# Patient Record
Sex: MALE | Race: WHITE | Employment: FULL TIME | URBAN - METROPOLITAN AREA
[De-identification: names, ages, dates, MRNs, and addresses within clinical notes are randomized per-mention and may not be internally consistent; named-entity substitution may affect disease eponyms.]

---

## 2023-01-11 ENCOUNTER — APPOINTMENT (OUTPATIENT)
Dept: CT IMAGING | Age: 63
End: 2023-01-11
Attending: EMERGENCY MEDICINE

## 2023-01-11 ENCOUNTER — APPOINTMENT (OUTPATIENT)
Dept: GENERAL RADIOLOGY | Age: 63
End: 2023-01-11
Attending: EMERGENCY MEDICINE

## 2023-01-11 ENCOUNTER — HOSPITAL ENCOUNTER (EMERGENCY)
Age: 63
Discharge: HOME | End: 2023-01-11
Attending: EMERGENCY MEDICINE

## 2023-01-11 VITALS
TEMPERATURE: 97.9 F | RESPIRATION RATE: 14 BRPM | SYSTOLIC BLOOD PRESSURE: 134 MMHG | HEART RATE: 67 BPM | WEIGHT: 170 LBS | DIASTOLIC BLOOD PRESSURE: 80 MMHG | OXYGEN SATURATION: 98 %

## 2023-01-11 DIAGNOSIS — S42.109A CLOSED FRACTURE OF SCAPULA, UNSPECIFIED LATERALITY, UNSPECIFIED PART OF SCAPULA, INITIAL ENCOUNTER: Primary | ICD-10-CM

## 2023-01-11 LAB
ALBUMIN SERPL-MCNC: 3.7 G/DL (ref 3.4–5)
ALBUMIN/GLOB SERPL: 0.9 (ref 0.8–1.7)
ALP SERPL-CCNC: 71 U/L (ref 45–117)
ALT SERPL-CCNC: 24 U/L (ref 16–61)
ANION GAP SERPL CALC-SCNC: 6 MMOL/L (ref 3–18)
AST SERPL-CCNC: 33 U/L (ref 10–38)
ATRIAL RATE: 56 BPM
BASOPHILS # BLD: 0.1 K/UL (ref 0–0.1)
BASOPHILS NFR BLD: 1 % (ref 0–2)
BILIRUB SERPL-MCNC: 0.4 MG/DL (ref 0.2–1)
BUN SERPL-MCNC: 21 MG/DL (ref 7–18)
BUN/CREAT SERPL: 18 (ref 12–20)
CALCIUM SERPL-MCNC: 9.1 MG/DL (ref 8.5–10.1)
CALCULATED R AXIS, ECG10: -12 DEGREES
CALCULATED T AXIS, ECG11: 57 DEGREES
CHLORIDE SERPL-SCNC: 105 MMOL/L (ref 100–111)
CO2 SERPL-SCNC: 26 MMOL/L (ref 21–32)
CREAT SERPL-MCNC: 1.14 MG/DL (ref 0.6–1.3)
DIAGNOSIS, 93000: NORMAL
DIFFERENTIAL METHOD BLD: ABNORMAL
EOSINOPHIL # BLD: 0.1 K/UL (ref 0–0.4)
EOSINOPHIL NFR BLD: 0 % (ref 0–5)
ERYTHROCYTE [DISTWIDTH] IN BLOOD BY AUTOMATED COUNT: 13.8 % (ref 11.6–14.5)
GLOBULIN SER CALC-MCNC: 4 G/DL (ref 2–4)
GLUCOSE SERPL-MCNC: 106 MG/DL (ref 74–99)
HCT VFR BLD AUTO: 40.7 % (ref 36–48)
HGB BLD-MCNC: 12.8 G/DL (ref 13–16)
IMM GRANULOCYTES # BLD AUTO: 0.1 K/UL (ref 0–0.04)
IMM GRANULOCYTES NFR BLD AUTO: 0 % (ref 0–0.5)
LYMPHOCYTES # BLD: 3.7 K/UL (ref 0.9–3.6)
LYMPHOCYTES NFR BLD: 26 % (ref 21–52)
MCH RBC QN AUTO: 27.1 PG (ref 24–34)
MCHC RBC AUTO-ENTMCNC: 31.4 G/DL (ref 31–37)
MCV RBC AUTO: 86 FL (ref 78–100)
MONOCYTES # BLD: 1 K/UL (ref 0.05–1.2)
MONOCYTES NFR BLD: 7 % (ref 3–10)
NEUTS SEG # BLD: 9.4 K/UL (ref 1.8–8)
NEUTS SEG NFR BLD: 66 % (ref 40–73)
NRBC # BLD: 0 K/UL (ref 0–0.01)
NRBC BLD-RTO: 0 PER 100 WBC
P-R INTERVAL, ECG05: 128 MS
PLATELET # BLD AUTO: 336 K/UL (ref 135–420)
PMV BLD AUTO: 9.1 FL (ref 9.2–11.8)
POTASSIUM SERPL-SCNC: 4.3 MMOL/L (ref 3.5–5.5)
PROT SERPL-MCNC: 7.7 G/DL (ref 6.4–8.2)
Q-T INTERVAL, ECG07: 394 MS
QRS DURATION, ECG06: 112 MS
QTC CALCULATION (BEZET), ECG08: 380 MS
RBC # BLD AUTO: 4.73 M/UL (ref 4.35–5.65)
SODIUM SERPL-SCNC: 137 MMOL/L (ref 136–145)
TROPONIN-HIGH SENSITIVITY: 10 NG/L (ref 0–78)
TROPONIN-HIGH SENSITIVITY: 15 NG/L (ref 0–78)
TROPONIN-HIGH SENSITIVITY: 17 NG/L (ref 0–78)
VENTRICULAR RATE, ECG03: 56 BPM
WBC # BLD AUTO: 14.2 K/UL (ref 4.6–13.2)

## 2023-01-11 PROCEDURE — 72128 CT CHEST SPINE W/O DYE: CPT

## 2023-01-11 PROCEDURE — 71260 CT THORAX DX C+: CPT

## 2023-01-11 PROCEDURE — 96374 THER/PROPH/DIAG INJ IV PUSH: CPT

## 2023-01-11 PROCEDURE — 74011000636 HC RX REV CODE- 636: Performed by: EMERGENCY MEDICINE

## 2023-01-11 PROCEDURE — 80053 COMPREHEN METABOLIC PANEL: CPT

## 2023-01-11 PROCEDURE — 74011250636 HC RX REV CODE- 250/636: Performed by: EMERGENCY MEDICINE

## 2023-01-11 PROCEDURE — 72125 CT NECK SPINE W/O DYE: CPT

## 2023-01-11 PROCEDURE — 96375 TX/PRO/DX INJ NEW DRUG ADDON: CPT

## 2023-01-11 PROCEDURE — 99285 EMERGENCY DEPT VISIT HI MDM: CPT

## 2023-01-11 PROCEDURE — 85025 COMPLETE CBC W/AUTO DIFF WBC: CPT

## 2023-01-11 PROCEDURE — 73030 X-RAY EXAM OF SHOULDER: CPT

## 2023-01-11 PROCEDURE — 84484 ASSAY OF TROPONIN QUANT: CPT

## 2023-01-11 PROCEDURE — 74011250637 HC RX REV CODE- 250/637: Performed by: EMERGENCY MEDICINE

## 2023-01-11 RX ORDER — DIAZEPAM 10 MG/2ML
5 INJECTION INTRAMUSCULAR
Status: COMPLETED | OUTPATIENT
Start: 2023-01-11 | End: 2023-01-11

## 2023-01-11 RX ORDER — FENTANYL CITRATE 50 UG/ML
100 INJECTION, SOLUTION INTRAMUSCULAR; INTRAVENOUS
Status: COMPLETED | OUTPATIENT
Start: 2023-01-11 | End: 2023-01-11

## 2023-01-11 RX ORDER — OXYCODONE HYDROCHLORIDE 5 MG/1
10 TABLET ORAL ONCE
Status: COMPLETED | OUTPATIENT
Start: 2023-01-11 | End: 2023-01-11

## 2023-01-11 RX ADMIN — IOPAMIDOL 100 ML: 612 INJECTION, SOLUTION INTRAVENOUS at 16:38

## 2023-01-11 RX ADMIN — FENTANYL CITRATE 100 MCG: 50 INJECTION INTRAMUSCULAR; INTRAVENOUS at 15:48

## 2023-01-11 RX ADMIN — OXYCODONE HYDROCHLORIDE 10 MG: 5 TABLET ORAL at 18:27

## 2023-01-11 RX ADMIN — DIAZEPAM 5 MG: 5 INJECTION, SOLUTION INTRAMUSCULAR; INTRAVENOUS at 19:18

## 2023-01-11 NOTE — ED PROVIDER NOTES
EMERGENCY DEPARTMENT HISTORY AND PHYSICAL EXAM      Date: 1/11/2023  Patient Name: Marc Page    History of Presenting Illness     Chief Complaint   Patient presents with    Back Pain    Arm Pain       History Provided By: Patient    HPI: Marc Page, 63 y.o. male with no pertinent past medical history presents to the emergency department chief complaint of back pain. Patient states describes arrival they were doing work on a tree when a limb fell hitting him in his upper back. Since incident constant, severe pain that wraps around to his chest.  Pain is worse with movement. He denied any head injury or loss of consciousness. Denies any arm or leg numbness/weakness. PCP: Other, MD Milli    REM      Past History     Past Medical History:  History reviewed, no pertinent past medical history    Past Surgical History:  No past surgical history on file. Family History:  No family history on file. Social History:  Denies current heavy alcohol or illicit drug use    Allergies:  No Known Allergies      Review of Systems   Review of Systems  Pertinent positives and negatives per HPI  Physical Exam   Physical Exam    GENERAL: alert and oriented, no acute distress  EYES: PEERL, No injection, discharge or icterus. ENT: Mucous membranes pink and moist.  NECK: Supple paraspinal tenderness  LUNGS: Airway patent. Non-labored respirations. Breath sounds clear with good air entry bilaterally. HEART: Regular rate and rhythm. No peripheral edema  ABDOMEN: Non-distended and non-tender, without guarding or rebound. SKIN:  warm, dry  MSK/EXTREMITIES: Extremities without swelling, tenderness or deformity, symmetric with normal ROM. Large hematoma in the upper thoracic spine with midline and paraspinal tenderness extending across his upper back  NEUROLOGICAL: Alert, oriented.    strength is 5/5 bilateral upper extremities, lower extremity strength on knee extension and flexion is 5/5 sensation intact equal throughout to light touch in the upper and lower extremities      Diagnostic Study Results     Labs -     Recent Results (from the past 12 hour(s))   CBC WITH AUTOMATED DIFF    Collection Time: 01/11/23  3:40 PM   Result Value Ref Range    WBC 14.2 (H) 4.6 - 13.2 K/uL    RBC 4.73 4.35 - 5.65 M/uL    HGB 12.8 (L) 13.0 - 16.0 g/dL    HCT 40.7 36.0 - 48.0 %    MCV 86.0 78.0 - 100.0 FL    MCH 27.1 24.0 - 34.0 PG    MCHC 31.4 31.0 - 37.0 g/dL    RDW 13.8 11.6 - 14.5 %    PLATELET 833 313 - 702 K/uL    MPV 9.1 (L) 9.2 - 11.8 FL    NRBC 0.0 0  WBC    ABSOLUTE NRBC 0.00 0.00 - 0.01 K/uL    NEUTROPHILS 66 40 - 73 %    LYMPHOCYTES 26 21 - 52 %    MONOCYTES 7 3 - 10 %    EOSINOPHILS 0 0 - 5 %    BASOPHILS 1 0 - 2 %    IMMATURE GRANULOCYTES 0 0.0 - 0.5 %    ABS. NEUTROPHILS 9.4 (H) 1.8 - 8.0 K/UL    ABS. LYMPHOCYTES 3.7 (H) 0.9 - 3.6 K/UL    ABS. MONOCYTES 1.0 0.05 - 1.2 K/UL    ABS. EOSINOPHILS 0.1 0.0 - 0.4 K/UL    ABS. BASOPHILS 0.1 0.0 - 0.1 K/UL    ABS. IMM. GRANS. 0.1 (H) 0.00 - 0.04 K/UL    DF AUTOMATED     METABOLIC PANEL, COMPREHENSIVE    Collection Time: 01/11/23  3:40 PM   Result Value Ref Range    Sodium 137 136 - 145 mmol/L    Potassium 4.3 3.5 - 5.5 mmol/L    Chloride 105 100 - 111 mmol/L    CO2 26 21 - 32 mmol/L    Anion gap 6 3.0 - 18 mmol/L    Glucose 106 (H) 74 - 99 mg/dL    BUN 21 (H) 7.0 - 18 MG/DL    Creatinine 1.14 0.6 - 1.3 MG/DL    BUN/Creatinine ratio 18 12 - 20      eGFR >60 >60 ml/min/1.73m2    Calcium 9.1 8.5 - 10.1 MG/DL    Bilirubin, total 0.4 0.2 - 1.0 MG/DL    ALT (SGPT) 24 16 - 61 U/L    AST (SGOT) 33 10 - 38 U/L    Alk.  phosphatase 71 45 - 117 U/L    Protein, total 7.7 6.4 - 8.2 g/dL    Albumin 3.7 3.4 - 5.0 g/dL    Globulin 4.0 2.0 - 4.0 g/dL    A-G Ratio 0.9 0.8 - 1.7     TROPONIN-HIGH SENSITIVITY    Collection Time: 01/11/23  3:40 PM   Result Value Ref Range    Troponin-High Sensitivity 10 0 - 78 ng/L   EKG, 12 LEAD, INITIAL    Collection Time: 01/11/23  3:55 PM   Result Value Ref Range    Ventricular Rate 56 BPM    Atrial Rate 56 BPM    P-R Interval 128 ms    QRS Duration 112 ms    Q-T Interval 394 ms    QTC Calculation (Bezet) 380 ms    Calculated R Axis -12 degrees    Calculated T Axis 57 degrees    Diagnosis       Sinus bradycardia  Incomplete right bundle branch block  Moderate voltage criteria for LVH, may be normal variant ( R in aVL , [employer]   product )  Septal infarct , age undetermined  Abnormal ECG  No previous ECGs available     TROPONIN-HIGH SENSITIVITY    Collection Time: 01/11/23  5:25 PM   Result Value Ref Range    Troponin-High Sensitivity 15 0 - 78 ng/L       Radiologic Studies -   CT CHEST W CONT   Final Result      1. Comminuted right scapular fracture involving its superior border with mild   displacement, glenoid is intact, no articular surface involvement. 2. Superficial stranding, nonorganized hematoma soft tissues of the upper back,   para midline regions with ill-defined swelling of the paraspinous musculature in   this region. 3. No acute traumatic injuries within the chest cavity. CT SPINE [facility] WO CONT   Final Result      No fracture or acute malalignment. Mild-moderate diffuse spondylosis with mild   regions of disc space narrowing, mild endplate irregularities and associated   hypertrophic changes. Trace degenerative height loss multiple adjacent vertebral   bodies within the midthoracic region, no acute compression fractures. CT SPINE CERV WO CONT   Final Result      1. No cervical spine fracture or acute malalignment. Multilevel cervical   degenerative changes. XR SHOULDER RT AP/LAT MIN 2 V    (Results Pending)     CT Results  (Last 48 hours)                 01/11/23 1638  CT CHEST W CONT Final result    Impression:      1. Comminuted right scapular fracture involving its superior border with mild   displacement, glenoid is intact, no articular surface involvement.         2. Superficial stranding, nonorganized hematoma soft tissues of the upper back,   para midline regions with ill-defined swelling of the paraspinous musculature in   this region. 3. No acute traumatic injuries within the chest cavity. Narrative:  EXAM: CT chest        INDICATION: Back pain, tree limb fell and struck back       COMPARISON: None. TECHNIQUE: Axial CT imaging from the thoracic inlet through the diaphragm with   intravenous contrast. Multiplanar reformats were generated. One or more dose   reduction techniques were used on this CT: automated exposure control,   adjustment of the mAs and/or kVp according to patient size, and iterative   reconstruction techniques. The specific techniques used on this CT exam have   been documented in the patient's electronic medical record. Digital Imaging and   Communications in the Medicine (DICOM) format image data are available to   nonaffiliated external healthcare facilities or entities on a secure, media   free, reciprocally searchable basis with patient authorization for at least a 12   month period after this study. _______________       FINDINGS:       LUNGS: No acute consolidation. Minimal basilar atelectasis. Few small pulmonary   nodules, largest is subpleural left lower lobe, 4 mm on image 81. PLEURA: Unremarkable. AIRWAY: Patent airways. MEDIASTINUM: Normal heart size. No pericardial effusion. LYMPH NODES: No enlarged lymph nodes. UPPER ABDOMEN: Nothing acute upper abdomen. Multiple hepatic cysts. Paullette Rakes BONES: Comminuted right scapular fracture involving its superior border with   mild displacement, no articular surface involvement. OTHER: Superficial stranding, nonorganized hematoma soft tissues of the upper   back, para midline regions with ill-defined swelling of the paraspinous   musculature in this region. _______________           01/11/23 1637  CT SPINE THORAC WO CONT Final result    Impression:      No fracture or acute malalignment. Mild-moderate diffuse spondylosis with mild   regions of disc space narrowing, mild endplate irregularities and associated   hypertrophic changes. Trace degenerative height loss multiple adjacent vertebral   bodies within the midthoracic region, no acute compression fractures. Narrative:  EXAM: CT thoracic spine       INDICATION: Back pain and arm pain. COMPARISON: None. TECHNIQUE: Axial CT imaging of the thoracic spine was performed without   intravenous contrast. Multiplanar reformats were generated. One or more dose   reduction techniques were used on this CT: automated exposure control,   adjustment of the mAs and/or kVp according to patient size, and iterative   reconstruction techniques. The specific techniques used on this CT exam have   been documented in the patient's electronic medical record. Digital Imaging and   Communications in the Medicine (DICOM) format image data are available to   nonaffiliated external healthcare facilities or entities on a secure, media   free, reciprocally searchable basis with patient authorization for at least a 12   month period after this study. _______________       FINDINGS:       SPINE: No fracture or acute malalignment. Mild-moderate diffuse spondylosis with   mild regions of disc space narrowing, mild endplate irregularities and   associated hypertrophic changes. Trace degenerative height loss multiple   adjacent vertebral bodies within the midthoracic region, no acute compression   fractures. PARAVERTEBRAL SOFT TISSUES: Patient motion artifact. Nothing acute. OTHER: None.       _______________           01/11/23 1637  CT SPINE CERV WO CONT Final result    Impression:      1. No cervical spine fracture or acute malalignment. Multilevel cervical   degenerative changes. Narrative:  EXAM: CT cervical spine       INDICATION: Pain. COMPARISON: None.        TECHNIQUE: Axial CT imaging of the cervical spine was performed from the skull   base to the thoracic inlet without intravenous contrast. Multiplanar reformats   were generated. One or more dose reduction techniques were used on this CT:   automated exposure control, adjustment of the mAs and/or kVp according to   patient size, and iterative reconstruction techniques. The specific techniques   used on this CT exam have been documented in the patient's electronic medical   record. Digital Imaging and Communications in the Medicine (DICOM) format image   data are available to nonaffiliated external healthcare facilities or entities   on a secure, media free, reciprocally searchable basis with patient   authorization for at least a 12 month period after this study. _______________       FINDINGS:       SPINE:        No cervical spine fracture or acute malalignment. Mild multilevel cervical   degenerative changes, mild disc space narrowing lower cervical spine with   associated hypertrophic changes. Mild central canal narrowing, AP diameter of   the central canal at C6-7 approximately 9 mm. Uncovertebral joint hypertrophy   results in moderate left, mild neural foraminal narrowing at this level, milder   elsewhere. PREVERTEBRAL SOFT TISSUES: No paravertebral hematoma. LUNG APICES: Aerated. OTHER: None.       _______________                 CXR Results  (Last 48 hours)      None              Medical Decision Making       I reviewed the vital signs, available nursing notes, past medical history, past surgical history, family history and social history. Vital Signs-Reviewed the patient's vital signs.   Patient Vitals for the past 12 hrs:   Temp Pulse Resp BP SpO2   01/11/23 1947 -- 67 20 -- 97 %   01/11/23 1932 -- (!) 57 14 -- 100 %   01/11/23 1917 -- 65 16 (!) 157/83 100 %   01/11/23 1912 -- (!) 57 25 (!) 157/83 --   01/11/23 1910 -- 88 15 -- 97 %   01/11/23 1854 -- 78 19 -- 92 %   01/11/23 1839 -- (!) 53 21 -- 98 %   01/11/23 1830 -- (!) 55 20 138/88 100 % 01/11/23 1827 -- (!) 56 14 -- 97 %   01/11/23 1557 -- -- 16 (!) 169/88 99 %   01/11/23 1532 97.9 °F (36.6 °C) 67 15 (!) 181/88 99 %       Provider Notes (Medical Decision Making): On presentation, the patient is well appearing presenting after a tree limb struck his upper back. Differential included thoracic spine injury, scapular injury, hematoma, cervical spine injury, blunt cardiac injury, vascular injury. CT imaging of the chest showed a comminuted, displaced right scapular fracture. Patient having severe pain requiring multiple doses of IV pain medication with some minimal improvement. Due to his severe pain feel that he will need hospital observation for pain management so we will plan to transfer to a trauma center due to the nature of his injury. ED Course:   Initial assessment performed. The patients presenting problems have been discussed, and they are in agreement with the care plan formulated and outlined with them. I have encouraged them to ask questions as they arise throughout their visit. Patient was given the following medications:  Medications   fentaNYL citrate (PF) injection 100 mcg (100 mcg IntraVENous Given 1/11/23 1548)   iopamidoL (ISOVUE 300) 300 mg iodine /mL (61 %) contrast injection 100 mL (100 mL IntraVENous Given 1/11/23 1638)   oxyCODONE IR (ROXICODONE) tablet 10 mg (10 mg Oral Given 1/11/23 1827)   diazePAM (VALIUM) injection 5 mg (5 mg IntraVENous Given 1/11/23 1918)              CONSULTS: Consult with emergency department physician at [facility], will accept as a transfer    Transfer Note:   Patient is being transferred to [facility]. Transfer was accepted by Dr. Dr. Rom De Leon. The reasons for their transfer have been discussed with them and available family. They convey agreement and understanding for the need to be transferred as explained to them by me.      Critical Care Note      IMPENDING DETERIORATION -Cardiovascular  ASSOCIATED RISK FACTORS - Bleeding  MANAGEMENT- Bedside Assessment, Supervision of Care, and Transfer  INTERPRETATION -  CT Scan and Blood Pressure  INTERVENTIONS -transfer to higher level of care/trauma center  CASE REVIEW -accepting ED physician  TREATMENT RESPONSE -Improved  PERFORMED BY - Self    NOTES   :  I have provided a total of 35 minutes of critical time not including time spent on separately documented procedures. During this entire length of time I was immediately available to the patient      Disposition:  transfer    PLAN:  1. transfer    Diagnosis     Clinical Impression:   1. Closed fracture of scapula, unspecified laterality, unspecified part of scapula, initial encounter          ISonia MD am the first provider for this patient and am the attending of record for this patient encounter. Sonia Ramires MD        Please note that this dictation was completed with Dragon, computer voice recognition software. Quite often unanticipated grammatical, syntax, homophones, and other interpretive errors are inadvertently transcribed by the computer software. Please disregard these errors. Additionally, please excuse any errors that have escaped final proofreading.

## 2023-01-11 NOTE — ED TRIAGE NOTES
Pt presents with upper back pain across shoulder blades and R shoulder pain after pine log fell on him from unknown height approx 1500. Denies getting hit on head, denies neck pain, denies blood thinners.  Full ROM neck, decreased ROM RUE  Swelling/bruising noted

## 2023-01-11 NOTE — ED NOTES
Received patient awake on bed, alert and oriented. With chief complaint of back pain and right arm pain, an object fell on his back. No other complaints, no unusualities noted.   Awaiting to be seen by Attending